# Patient Record
Sex: FEMALE | URBAN - METROPOLITAN AREA
[De-identification: names, ages, dates, MRNs, and addresses within clinical notes are randomized per-mention and may not be internally consistent; named-entity substitution may affect disease eponyms.]

---

## 2021-07-01 ENCOUNTER — OFFICE VISIT (OUTPATIENT)
Dept: OTOLARYNGOLOGY | Facility: CLINIC | Age: 81
End: 2021-07-01
Payer: MEDICARE

## 2021-07-01 VITALS
SYSTOLIC BLOOD PRESSURE: 139 MMHG | HEIGHT: 60 IN | DIASTOLIC BLOOD PRESSURE: 69 MMHG | TEMPERATURE: 98 F | WEIGHT: 140 LBS | BODY MASS INDEX: 27.48 KG/M2

## 2021-07-01 DIAGNOSIS — H61.23 BILATERAL IMPACTED CERUMEN: Primary | ICD-10-CM

## 2021-07-01 DIAGNOSIS — H72.93 TYMPANIC MEMBRANE PERFORATION, BILATERAL: ICD-10-CM

## 2021-07-01 DIAGNOSIS — H91.13 PRESBYCUSIS OF BOTH EARS: ICD-10-CM

## 2021-07-01 PROCEDURE — 99203 OFFICE O/P NEW LOW 30 MIN: CPT | Performed by: OTOLARYNGOLOGY

## 2021-07-01 PROCEDURE — 69210 REMOVE IMPACTED EAR WAX UNI: CPT | Performed by: OTOLARYNGOLOGY

## 2021-07-01 NOTE — PROGRESS NOTES
Rita Leroy is a 78year old female.  Patient presents with:  Hearing Loss: decreased hearing in both ears, hearing test done on 6/11/21        HISTORY OF PRESENT ILLNESS  7/1/2021   Here for evaluation of problems with her hearing aids she sees a audiologist or Ex-Partner:       Emotionally Abused:       Physically Abused:       Sexually Abused:     No family history on file.   Past Medical History:   Diagnosis Date   • Atrial fibrillation [facility])    • Other and unspecified hyperlipidemia    • Unspecified essenti with small conductive loss bilaterally       PROCEDURE: After informed consent was obtained, the patients ears were examined under the operating microscope. Cerumen impaction was removed from right ears using suction.  Tympanic membrane was  noted to small

## 2024-04-24 ENCOUNTER — OFFICE VISIT (OUTPATIENT)
Dept: OTOLARYNGOLOGY | Facility: CLINIC | Age: 84
End: 2024-04-24

## 2024-04-24 VITALS — WEIGHT: 136 LBS | HEIGHT: 57 IN | BODY MASS INDEX: 29.34 KG/M2

## 2024-04-24 DIAGNOSIS — H60.502 ACUTE OTITIS EXTERNA OF LEFT EAR, UNSPECIFIED TYPE: Primary | ICD-10-CM

## 2024-04-24 PROCEDURE — 69210 REMOVE IMPACTED EAR WAX UNI: CPT | Performed by: STUDENT IN AN ORGANIZED HEALTH CARE EDUCATION/TRAINING PROGRAM

## 2024-04-24 PROCEDURE — 99213 OFFICE O/P EST LOW 20 MIN: CPT | Performed by: STUDENT IN AN ORGANIZED HEALTH CARE EDUCATION/TRAINING PROGRAM

## 2024-04-24 RX ORDER — ALLOPURINOL 100 MG/1
TABLET ORAL
COMMUNITY
Start: 2024-04-02

## 2024-04-24 RX ORDER — FLUTICASONE FUROATE, UMECLIDINIUM BROMIDE AND VILANTEROL TRIFENATATE 200; 62.5; 25 UG/1; UG/1; UG/1
POWDER RESPIRATORY (INHALATION)
COMMUNITY
Start: 2024-03-21

## 2024-04-24 RX ORDER — ACETIC ACID 20.65 MG/ML
4 SOLUTION AURICULAR (OTIC) 2 TIMES DAILY
Qty: 1 EACH | Refills: 0 | Status: SHIPPED | OUTPATIENT
Start: 2024-04-24 | End: 2024-05-01

## 2024-04-24 RX ORDER — OLMESARTAN MEDOXOMIL 40 MG/1
40 TABLET ORAL DAILY
COMMUNITY
Start: 2024-02-22

## 2024-04-24 NOTE — PROGRESS NOTES
Unique Ruiz is a 84 year old female.   Chief Complaint   Patient presents with    Ear Problem     Pt reports drainage from left ear, pt wears hearing aids.       ASSESSMENT AND PLAN:   1. Acute otitis externa of left ear, unspecified type  84-year-old presents with drainage and leakage from her left ear.  She wears hearing aids.    Exam she has a fungal otitis externa of the left ear.  This was debrided.  There is a small tympanic membrane perforation centrally    Will begin her on acetic acid drops for this fungal otitis externa in the setting of hearing aids.  She will keep the hearing aid out.  Would like to see her back next week for repeat debridement.  She is agreeable with the plan      The patient indicates understanding of these issues and agrees to the plan.      EXAM:   Ht 4' 9\" (1.448 m)   Wt 136 lb (61.7 kg)   BMI 29.43 kg/m²     Pertinent exam findings may also be noted above in assessment and plan     System Details   Skin Inspection - Normal.   Constitutional Overall appearance - Normal.   Head/Face Symmetric, TMJ tenderness not present    Eyes EOMI, PERRL   Right ear:  Canal clear, TM intact, no JERED   Left ear:  Canal clear, TM intact, no JERED   Nose: Septum midline, inferior turbinates not enlarged, nasal valves without collapse    Oral cavity/Oropharynx: No lesions or masses on inspection or palpation, tonsils symmetric    Neck: Soft without LAD, thyroid not enlarged  Voice clear/ no stridor   Other:      Scopes and Procedures:     Canals:  Left: Canal with cerumen preventing adequate view of TM, debrided with instrumentation    Tympanic Membranes:  Left: Normal tympanic membrane.     TM Visualized Method:   Left TM examined via otomicroscopy.      PROCEDURE:   Removal of cerumen impaction   The cerumen impaction was completely removed on the left side using microscopy as necessary.   Removal was completed by using a curette and suction.         Current Outpatient Medications   Medication Sig  Dispense Refill    allopurinol 100 MG Oral Tab       Cholecalciferol 50 MCG (2000 UT) Oral Tab Take by mouth daily.      TRELEGY ELLIPTA 200-62.5-25 MCG/ACT Inhalation Aerosol Powder, Breath Activated       Olmesartan Medoxomil 40 MG Oral Tab Take 1 tablet (40 mg total) by mouth daily.      acetic acid 2 % Otic Solution Place 4 drops into the left ear in the morning and 4 drops before bedtime. Do all this for 7 days. 1 each 0    Ferrous Sulfate 325 (65 FE) MG Oral Tab   6    hydrALAzine HCl 25 MG Oral Tab   6    Pantoprazole Sodium 40 MG Oral Tab EC   2    valsartan 320 MG Oral Tab   2    COUMADIN 1 MG Oral Tab   2    KLOR-CON M10 10 MEQ Oral Tab CR   3    BENICAR HCT 40-12.5 MG Oral Tab   2    furosemide (LASIX) 20 MG Oral Tab   3    Metoprolol Succinate ER (TOPROL XL) 25 MG Oral Tablet 24 Hr   2    Atorvastatin Calcium (LIPITOR) 40 MG Oral Tab   2    Alendronate Sodium (FOSAMAX) 70 MG Oral Tab   2    NIFEdipine ER Osmotic (PROCARDIA XL) 90 MG Oral Tablet 24 Hr   2    Pantoprazole Sodium (PROTONIX OR) Inject  into the vein.      CefTRIAXone Sodium 2 G Injection Recon Soln  (Patient not taking: Reported on 7/1/2021)        Past Medical History:    Atrial fibrillation (HCC)    Other and unspecified hyperlipidemia    Unspecified essential hypertension      Social History:  Social History     Socioeconomic History    Marital status:    Tobacco Use    Smoking status: Never    Smokeless tobacco: Never   Substance and Sexual Activity    Alcohol use: Yes     Comment: rarely    Drug use: No     Social Determinants of Health      Received from [facility]          Maximo Rosario MD  4/24/2024  7:22 AM

## 2024-05-01 ENCOUNTER — OFFICE VISIT (OUTPATIENT)
Dept: OTOLARYNGOLOGY | Facility: CLINIC | Age: 84
End: 2024-05-01

## 2024-05-01 ENCOUNTER — TELEPHONE (OUTPATIENT)
Dept: OTOLARYNGOLOGY | Facility: CLINIC | Age: 84
End: 2024-05-01

## 2024-05-01 VITALS — HEIGHT: 57 IN | WEIGHT: 136 LBS | BODY MASS INDEX: 29.34 KG/M2

## 2024-05-01 DIAGNOSIS — H60.502 ACUTE OTITIS EXTERNA OF LEFT EAR, UNSPECIFIED TYPE: Primary | ICD-10-CM

## 2024-05-01 PROCEDURE — 87205 SMEAR GRAM STAIN: CPT | Performed by: STUDENT IN AN ORGANIZED HEALTH CARE EDUCATION/TRAINING PROGRAM

## 2024-05-01 PROCEDURE — 87077 CULTURE AEROBIC IDENTIFY: CPT | Performed by: STUDENT IN AN ORGANIZED HEALTH CARE EDUCATION/TRAINING PROGRAM

## 2024-05-01 PROCEDURE — 87070 CULTURE OTHR SPECIMN AEROBIC: CPT | Performed by: STUDENT IN AN ORGANIZED HEALTH CARE EDUCATION/TRAINING PROGRAM

## 2024-05-01 PROCEDURE — 87186 SC STD MICRODIL/AGAR DIL: CPT | Performed by: STUDENT IN AN ORGANIZED HEALTH CARE EDUCATION/TRAINING PROGRAM

## 2024-05-01 PROCEDURE — 87075 CULTR BACTERIA EXCEPT BLOOD: CPT | Performed by: STUDENT IN AN ORGANIZED HEALTH CARE EDUCATION/TRAINING PROGRAM

## 2024-05-01 NOTE — PROGRESS NOTES
Unique Ruiz is a 84 year old female.   Chief Complaint   Patient presents with    Follow - Up     Patient is here for acute otitis externa of left ear, reports drainage, on and off pain.       ASSESSMENT AND PLAN:   1. Acute otitis externa of left ear, unspecified type  A 84-year-old presents in follow-up regarding a left otitis externa.  She was seen last week started on acetic acid drops for fungal otitis externa.  Reports persistent drainage and itching    On exam she had not much improvement there is still a large amount of otorrhea greenish.  Tthat was thin andthis was taken for culture.  There was an anterior central tympanic membrane perforation    She did not have much improvement on the acetic acid drops.  Has a fungal otitis externa presumably in the setting of a tympanic membrane perforation.  Gentian violet instilled today and culture taken.  She is on Coumadin which appears to preclude Diflucan use orally but will check with her primary care provider if she is able to take this antifungal orally.Will obtain CT scan of her temporal bones if still not improving for CSF leak.  Will see her back in 1 week    The patient indicates understanding of these issues and agrees to the plan.      EXAM:   Ht 4' 9\" (1.448 m)   Wt 136 lb (61.7 kg)   BMI 29.43 kg/m²     Pertinent exam findings may also be noted above in assessment and plan     System Details   Skin Inspection - Normal.   Constitutional Overall appearance - Normal.   Head/Face Symmetric, TMJ tenderness not present    Eyes EOMI, PERRL   Right ear:  Canal clear, TM intact, no JERED   Left ear:  Canal clear, TM intact, no JERED   Nose: Septum midline, inferior turbinates not enlarged, nasal valves without collapse    Oral cavity/Oropharynx: No lesions or masses on inspection or palpation, tonsils symmetric    Neck: Soft without LAD, thyroid not enlarged  Voice clear/ no stridor   Other:      Scopes and Procedures:     Canals:  Left: Canal with cerumen  preventing adequate view of TM, debrided with instrumentation    Tympanic Membranes:  Left: Normal tympanic membrane.     TM Visualized Method:   Left TM examined via otomicroscopy.      PROCEDURE:   Removal of cerumen impaction   The cerumen impaction was completely removed on the left side using microscopy as necessary.   Removal was completed by using a curette and suction.         Current Outpatient Medications   Medication Sig Dispense Refill    allopurinol 100 MG Oral Tab       Cholecalciferol 50 MCG (2000 UT) Oral Tab Take by mouth daily.      TRELEGY ELLIPTA 200-62.5-25 MCG/ACT Inhalation Aerosol Powder, Breath Activated       Olmesartan Medoxomil 40 MG Oral Tab Take 1 tablet (40 mg total) by mouth daily.      acetic acid 2 % Otic Solution Place 4 drops into the left ear in the morning and 4 drops before bedtime. Do all this for 7 days. 1 each 0    Ferrous Sulfate 325 (65 FE) MG Oral Tab   6    hydrALAzine HCl 25 MG Oral Tab   6    Pantoprazole Sodium 40 MG Oral Tab EC   2    valsartan 320 MG Oral Tab   2    COUMADIN 1 MG Oral Tab   2    KLOR-CON M10 10 MEQ Oral Tab CR   3    BENICAR HCT 40-12.5 MG Oral Tab   2    furosemide (LASIX) 20 MG Oral Tab   3    Metoprolol Succinate ER (TOPROL XL) 25 MG Oral Tablet 24 Hr   2    Atorvastatin Calcium (LIPITOR) 40 MG Oral Tab   2    Alendronate Sodium (FOSAMAX) 70 MG Oral Tab   2    NIFEdipine ER Osmotic (PROCARDIA XL) 90 MG Oral Tablet 24 Hr   2    Pantoprazole Sodium (PROTONIX OR) Inject  into the vein.      CefTRIAXone Sodium 2 G Injection Recon Soln  (Patient not taking: Reported on 7/1/2021)        Past Medical History:    Atrial fibrillation (HCC)    Other and unspecified hyperlipidemia    Unspecified essential hypertension      Social History:  Social History     Socioeconomic History    Marital status:    Tobacco Use    Smoking status: Never    Smokeless tobacco: Never   Substance and Sexual Activity    Alcohol use: Yes     Comment: rarely    Drug use:  No     Social Determinants of Health      Received from [facility]          Maximo Rosario MD  5/1/2024  7:35 AM

## 2024-05-01 NOTE — TELEPHONE ENCOUNTER
Attempted to call patient and daughter in law, to relay doctor message.   Per Doctor Abraham, \"I would like to prescribe an antifungal pill called Diflucan but she is on Coumadin which can interact. Can you please ask if she can contact her PCP to see if this would be okay to take Diflucan 100 mg twice a day for 7 days\".    TCB

## 2024-05-03 ENCOUNTER — TELEPHONE (OUTPATIENT)
Dept: OTOLARYNGOLOGY | Facility: CLINIC | Age: 84
End: 2024-05-03

## 2024-05-03 NOTE — TELEPHONE ENCOUNTER
Per daughter-in-law they called patient's primary care physician and are unable to get a response as to whether they will ok her to start taking diflucan. Per daughter-in-law asking if there is anything else they can do. Please advise

## 2024-05-03 NOTE — TELEPHONE ENCOUNTER
I called patient's PCP Dr. Bradley Carlson's office about the Diflucan, the woman I spoke with said she won't have an answer until maybe tomorrow or Monday.  The  wants to look in the patient's chart. I was told the patient could potentially bleed more being on the Coumadin and taking the Diflucan. I will let Dr. Rosario know. The daughter is asking for an alternative to the Diflucan.

## 2024-05-06 ENCOUNTER — OFFICE VISIT (OUTPATIENT)
Dept: OTOLARYNGOLOGY | Facility: CLINIC | Age: 84
End: 2024-05-06

## 2024-05-06 DIAGNOSIS — H60.502 ACUTE OTITIS EXTERNA OF LEFT EAR, UNSPECIFIED TYPE: Primary | ICD-10-CM

## 2024-05-06 NOTE — PROGRESS NOTES
Unique Ruiz is a 84 year old female.   Chief Complaint   Patient presents with    Follow - Up     Acute otitis externa of left ear reevaluation        ASSESSMENT AND PLAN:   1. Acute otitis externa of left ear, unspecified type  84-year-old presents in follow-up regarding her left-sided otitis externa in the setting of a tympanic membrane perforation.  Her culture grew staph that was pansensitive.  She feels that for the most part the infection is resolved    On exam her ear is dry.  Small amount of residual gentian violet.  About a 15% central posterior tympanic membrane perforation    She had a good result with the gentian violet.  Discussed she can restart using her hearing aid discussed dry ear precautions with this.  Discussed she can consider meeting with Dr. Salazar regarding this tympanic membrane perforation she is currently thinking of observing the issue.  She can see me back as needed    The patient indicates understanding of these issues and agrees to the plan.      EXAM:   There were no vitals taken for this visit.    Pertinent exam findings may also be noted above in assessment and plan     System Details   Skin Inspection - Normal.   Constitutional Overall appearance - Normal.   Head/Face Symmetric, TMJ tenderness not present    Eyes EOMI, PERRL   Right ear:  Canal clear, TM intact, no JERED   Left ear:  Canal clear, TM intact, no JERED   Nose: Septum midline, inferior turbinates not enlarged, nasal valves without collapse    Oral cavity/Oropharynx: No lesions or masses on inspection or palpation, tonsils symmetric    Neck: Soft without LAD, thyroid not enlarged  Voice clear/ no stridor   Other:      Scopes and Procedures:     Canals:  Left: Canal with cerumen preventing adequate view of TM, debrided with instrumentation    Tympanic Membranes:  Left: Normal tympanic membrane.     TM Visualized Method:   Left TM examined via otomicroscopy.      PROCEDURE:   Removal of cerumen impaction   The cerumen  impaction was completely removed on the left side using microscopy as necessary.   Removal was completed by using a curette and suction.         Current Outpatient Medications   Medication Sig Dispense Refill    allopurinol 100 MG Oral Tab       Cholecalciferol 50 MCG (2000 UT) Oral Tab Take by mouth daily.      TRELEGY ELLIPTA 200-62.5-25 MCG/ACT Inhalation Aerosol Powder, Breath Activated       Olmesartan Medoxomil 40 MG Oral Tab Take 1 tablet (40 mg total) by mouth daily.      Ferrous Sulfate 325 (65 FE) MG Oral Tab   6    hydrALAzine HCl 25 MG Oral Tab   6    Pantoprazole Sodium 40 MG Oral Tab EC   2    valsartan 320 MG Oral Tab   2    COUMADIN 1 MG Oral Tab   2    KLOR-CON M10 10 MEQ Oral Tab CR   3    BENICAR HCT 40-12.5 MG Oral Tab   2    furosemide (LASIX) 20 MG Oral Tab   3    Metoprolol Succinate ER (TOPROL XL) 25 MG Oral Tablet 24 Hr   2    Atorvastatin Calcium (LIPITOR) 40 MG Oral Tab   2    Alendronate Sodium (FOSAMAX) 70 MG Oral Tab   2    NIFEdipine ER Osmotic (PROCARDIA XL) 90 MG Oral Tablet 24 Hr   2    Pantoprazole Sodium (PROTONIX OR) Inject  into the vein.      CefTRIAXone Sodium 2 G Injection Recon Soln  (Patient not taking: Reported on 7/1/2021)        Past Medical History:    Atrial fibrillation (HCC)    Other and unspecified hyperlipidemia    Unspecified essential hypertension      Social History:  Social History     Socioeconomic History    Marital status:    Tobacco Use    Smoking status: Never    Smokeless tobacco: Never   Substance and Sexual Activity    Alcohol use: Yes     Comment: rarely    Drug use: No     Social Determinants of Health      Received from [facility]          Maximo Rosario MD  5/6/2024  5:26 PM